# Patient Record
Sex: OTHER/UNKNOWN | Race: WHITE | NOT HISPANIC OR LATINO | Employment: PART TIME | URBAN - METROPOLITAN AREA
[De-identification: names, ages, dates, MRNs, and addresses within clinical notes are randomized per-mention and may not be internally consistent; named-entity substitution may affect disease eponyms.]

---

## 2024-08-02 ENCOUNTER — HOSPITAL ENCOUNTER (EMERGENCY)
Facility: HOSPITAL | Age: 39
Discharge: HOME | End: 2024-08-03
Payer: COMMERCIAL

## 2024-08-02 ENCOUNTER — APPOINTMENT (OUTPATIENT)
Dept: RADIOLOGY | Facility: HOSPITAL | Age: 39
End: 2024-08-02
Payer: COMMERCIAL

## 2024-08-02 VITALS
BODY MASS INDEX: 28.32 KG/M2 | DIASTOLIC BLOOD PRESSURE: 90 MMHG | OXYGEN SATURATION: 100 % | HEIGHT: 61 IN | SYSTOLIC BLOOD PRESSURE: 143 MMHG | WEIGHT: 150 LBS | TEMPERATURE: 97.8 F | HEART RATE: 67 BPM | RESPIRATION RATE: 16 BRPM

## 2024-08-02 DIAGNOSIS — N13.2 HYDRONEPHROSIS WITH URINARY OBSTRUCTION DUE TO URETERAL CALCULUS: Primary | ICD-10-CM

## 2024-08-02 LAB
ALBUMIN SERPL-MCNC: 4.8 G/DL (ref 3.5–5)
ALP BLD-CCNC: 87 U/L (ref 35–125)
ALT SERPL-CCNC: 20 U/L (ref 5–40)
ANION GAP SERPL CALC-SCNC: 13 MMOL/L
APPEARANCE UR: CLEAR
AST SERPL-CCNC: 16 U/L (ref 5–40)
BASOPHILS # BLD AUTO: 0.08 X10*3/UL (ref 0–0.1)
BASOPHILS NFR BLD AUTO: 0.5 %
BILIRUB SERPL-MCNC: 0.3 MG/DL (ref 0.1–1.2)
BILIRUB UR STRIP.AUTO-MCNC: NEGATIVE MG/DL
BUN SERPL-MCNC: 13 MG/DL (ref 8–25)
CALCIUM SERPL-MCNC: 9.9 MG/DL (ref 8.5–10.4)
CHLORIDE SERPL-SCNC: 101 MMOL/L (ref 97–107)
CO2 SERPL-SCNC: 23 MMOL/L (ref 24–31)
COLOR UR: COLORLESS
CREAT SERPL-MCNC: 1 MG/DL (ref 0.4–1.6)
EGFRCR SERPLBLD CKD-EPI 2021: 75 ML/MIN/1.73M*2
EOSINOPHIL # BLD AUTO: 0.19 X10*3/UL (ref 0–0.7)
EOSINOPHIL NFR BLD AUTO: 1.2 %
ERYTHROCYTE [DISTWIDTH] IN BLOOD BY AUTOMATED COUNT: 12.2 % (ref 11.5–14.5)
GLUCOSE SERPL-MCNC: 107 MG/DL (ref 65–99)
GLUCOSE UR STRIP.AUTO-MCNC: NORMAL MG/DL
HCG UR QL IA.RAPID: NEGATIVE
HCT VFR BLD AUTO: 42.8 % (ref 36–52)
HGB BLD-MCNC: 14.5 G/DL (ref 12–17.5)
IMM GRANULOCYTES # BLD AUTO: 0.06 X10*3/UL (ref 0–0.7)
IMM GRANULOCYTES NFR BLD AUTO: 0.4 % (ref 0–0.9)
KETONES UR STRIP.AUTO-MCNC: NEGATIVE MG/DL
LEUKOCYTE ESTERASE UR QL STRIP.AUTO: NEGATIVE
LIPASE SERPL-CCNC: 47 U/L (ref 16–63)
LYMPHOCYTES # BLD AUTO: 2.01 X10*3/UL (ref 1.2–4.8)
LYMPHOCYTES NFR BLD AUTO: 12.7 %
MCH RBC QN AUTO: 29.7 PG (ref 26–34)
MCHC RBC AUTO-ENTMCNC: 33.9 G/DL (ref 32–36)
MCV RBC AUTO: 88 FL (ref 80–100)
MONOCYTES # BLD AUTO: 0.93 X10*3/UL (ref 0.1–1)
MONOCYTES NFR BLD AUTO: 5.9 %
MUCOUS THREADS #/AREA URNS AUTO: ABNORMAL /LPF
NEUTROPHILS # BLD AUTO: 12.51 X10*3/UL (ref 1.2–7.7)
NEUTROPHILS NFR BLD AUTO: 79.3 %
NITRITE UR QL STRIP.AUTO: NEGATIVE
NRBC BLD-RTO: 0 /100 WBCS (ref 0–0)
PH UR STRIP.AUTO: 7.5 [PH]
PLATELET # BLD AUTO: 379 X10*3/UL (ref 150–450)
POTASSIUM SERPL-SCNC: 3.7 MMOL/L (ref 3.4–5.1)
PROT SERPL-MCNC: 8.1 G/DL (ref 5.9–7.9)
PROT UR STRIP.AUTO-MCNC: NEGATIVE MG/DL
RBC # BLD AUTO: 4.89 X10*6/UL (ref 4–5.9)
RBC # UR STRIP.AUTO: ABNORMAL /UL
RBC #/AREA URNS AUTO: >20 /HPF
SODIUM SERPL-SCNC: 137 MMOL/L (ref 133–145)
SP GR UR STRIP.AUTO: 1.01
UROBILINOGEN UR STRIP.AUTO-MCNC: NORMAL MG/DL
WBC # BLD AUTO: 15.8 X10*3/UL (ref 4.4–11.3)
WBC #/AREA URNS AUTO: ABNORMAL /HPF

## 2024-08-02 PROCEDURE — 83690 ASSAY OF LIPASE: CPT

## 2024-08-02 PROCEDURE — 96374 THER/PROPH/DIAG INJ IV PUSH: CPT

## 2024-08-02 PROCEDURE — 2500000004 HC RX 250 GENERAL PHARMACY W/ HCPCS (ALT 636 FOR OP/ED)

## 2024-08-02 PROCEDURE — 96375 TX/PRO/DX INJ NEW DRUG ADDON: CPT

## 2024-08-02 PROCEDURE — 74176 CT ABD & PELVIS W/O CONTRAST: CPT

## 2024-08-02 PROCEDURE — 85025 COMPLETE CBC W/AUTO DIFF WBC: CPT

## 2024-08-02 PROCEDURE — 74176 CT ABD & PELVIS W/O CONTRAST: CPT | Performed by: RADIOLOGY

## 2024-08-02 PROCEDURE — 96361 HYDRATE IV INFUSION ADD-ON: CPT

## 2024-08-02 PROCEDURE — 99284 EMERGENCY DEPT VISIT MOD MDM: CPT

## 2024-08-02 PROCEDURE — 81003 URINALYSIS AUTO W/O SCOPE: CPT

## 2024-08-02 PROCEDURE — 36415 COLL VENOUS BLD VENIPUNCTURE: CPT

## 2024-08-02 PROCEDURE — 81025 URINE PREGNANCY TEST: CPT

## 2024-08-02 PROCEDURE — 80053 COMPREHEN METABOLIC PANEL: CPT

## 2024-08-02 RX ORDER — KETOROLAC TROMETHAMINE 30 MG/ML
15 INJECTION, SOLUTION INTRAMUSCULAR; INTRAVENOUS ONCE
Status: COMPLETED | OUTPATIENT
Start: 2024-08-02 | End: 2024-08-02

## 2024-08-02 RX ORDER — ONDANSETRON HYDROCHLORIDE 2 MG/ML
4 INJECTION, SOLUTION INTRAVENOUS ONCE
Status: COMPLETED | OUTPATIENT
Start: 2024-08-02 | End: 2024-08-02

## 2024-08-02 ASSESSMENT — LIFESTYLE VARIABLES
EVER HAD A DRINK FIRST THING IN THE MORNING TO STEADY YOUR NERVES TO GET RID OF A HANGOVER: NO
EVER FELT BAD OR GUILTY ABOUT YOUR DRINKING: NO
HAVE YOU EVER FELT YOU SHOULD CUT DOWN ON YOUR DRINKING: NO
TOTAL SCORE: 0
HAVE PEOPLE ANNOYED YOU BY CRITICIZING YOUR DRINKING: NO

## 2024-08-02 ASSESSMENT — PAIN DESCRIPTION - FREQUENCY: FREQUENCY: CONSTANT/CONTINUOUS

## 2024-08-02 ASSESSMENT — PAIN SCALES - GENERAL
PAINLEVEL_OUTOF10: 7
PAINLEVEL_OUTOF10: 5 - MODERATE PAIN

## 2024-08-02 ASSESSMENT — PAIN DESCRIPTION - DESCRIPTORS: DESCRIPTORS: SHARP

## 2024-08-02 ASSESSMENT — PAIN DESCRIPTION - PAIN TYPE: TYPE: ACUTE PAIN

## 2024-08-02 ASSESSMENT — PAIN DESCRIPTION - ORIENTATION: ORIENTATION: LEFT

## 2024-08-02 ASSESSMENT — COLUMBIA-SUICIDE SEVERITY RATING SCALE - C-SSRS
6. HAVE YOU EVER DONE ANYTHING, STARTED TO DO ANYTHING, OR PREPARED TO DO ANYTHING TO END YOUR LIFE?: NO
1. IN THE PAST MONTH, HAVE YOU WISHED YOU WERE DEAD OR WISHED YOU COULD GO TO SLEEP AND NOT WAKE UP?: NO
2. HAVE YOU ACTUALLY HAD ANY THOUGHTS OF KILLING YOURSELF?: NO

## 2024-08-02 ASSESSMENT — PAIN - FUNCTIONAL ASSESSMENT: PAIN_FUNCTIONAL_ASSESSMENT: 0-10

## 2024-08-02 ASSESSMENT — PAIN DESCRIPTION - ONSET: ONSET: SUDDEN

## 2024-08-02 ASSESSMENT — PAIN DESCRIPTION - PROGRESSION: CLINICAL_PROGRESSION: NOT CHANGED

## 2024-08-02 NOTE — Clinical Note
Krysta Quinonez was seen and treated in our emergency department on 8/2/2024.  Krysta may return to work on 08/05/2024.       If you have any questions or concerns, please don't hesitate to call.      Selene German PA-C

## 2024-08-03 LAB — HOLD SPECIMEN: NORMAL

## 2024-08-03 PROCEDURE — 96375 TX/PRO/DX INJ NEW DRUG ADDON: CPT

## 2024-08-03 PROCEDURE — 2500000004 HC RX 250 GENERAL PHARMACY W/ HCPCS (ALT 636 FOR OP/ED)

## 2024-08-03 PROCEDURE — 2500000002 HC RX 250 W HCPCS SELF ADMINISTERED DRUGS (ALT 637 FOR MEDICARE OP, ALT 636 FOR OP/ED)

## 2024-08-03 RX ORDER — CEPHALEXIN 500 MG/1
500 CAPSULE ORAL 2 TIMES DAILY
Qty: 14 CAPSULE | Refills: 0 | Status: SHIPPED | OUTPATIENT
Start: 2024-08-03 | End: 2024-08-10

## 2024-08-03 RX ORDER — TAMSULOSIN HYDROCHLORIDE 0.4 MG/1
0.4 CAPSULE ORAL DAILY
Qty: 5 CAPSULE | Refills: 0 | Status: SHIPPED | OUTPATIENT
Start: 2024-08-03

## 2024-08-03 RX ORDER — TAMSULOSIN HYDROCHLORIDE 0.4 MG/1
0.4 CAPSULE ORAL DAILY
Status: DISCONTINUED | OUTPATIENT
Start: 2024-08-03 | End: 2024-08-03 | Stop reason: HOSPADM

## 2024-08-03 RX ORDER — OXYCODONE AND ACETAMINOPHEN 5; 325 MG/1; MG/1
1 TABLET ORAL EVERY 6 HOURS PRN
Qty: 6 TABLET | Refills: 0 | Status: SHIPPED | OUTPATIENT
Start: 2024-08-03 | End: 2024-08-06

## 2024-08-03 RX ORDER — ONDANSETRON 4 MG/1
4 TABLET, FILM COATED ORAL EVERY 6 HOURS
Qty: 12 TABLET | Refills: 0 | Status: SHIPPED | OUTPATIENT
Start: 2024-08-03 | End: 2024-08-06

## 2024-08-03 RX ORDER — MORPHINE SULFATE 2 MG/ML
2 INJECTION, SOLUTION INTRAMUSCULAR; INTRAVENOUS ONCE
Status: COMPLETED | OUTPATIENT
Start: 2024-08-03 | End: 2024-08-03

## 2024-08-03 NOTE — DISCHARGE INSTRUCTIONS
Please take Keflex twice daily for 7 days for coverage of infection.  Please take Flomax once daily for 5 days for dilation of the ureter to help pass your kidney stone.  Please use Zofran as needed for nausea and vomiting.  Please use Percocet as needed for severe pain and rotate with Tylenol and ibuprofen for mild to moderate pain.  It is crucial for you to follow-up with urology for further treatment of your kidney stones.  Please present back to ER if you develop any fevers or burning with urination or urinary frequency.  Refer to the attached instructions regarding hydronephrosis.    It is important to remember that your care does not end here and you must continue to monitor your condition closely. Please return to the emergency department for any worsening or concerning signs or symptoms as directed by our conversations and the discharge instructions. If you do not have a doctor please contact the referral number on your discharge instructions. Please contact any physician specialists provided in your discharge notes as it is very important to follow up with them regarding your condition. If you are unable to reach the physicians provided, please come back to the Emergency Department at any time.

## 2024-08-03 NOTE — ED TRIAGE NOTES
Pt presents to the ED for L flank pain that has been coming and going throughout the day. Pt denies any urinary complaints. Pt denies any N/V/D. Pt has a hx of kidney stones.

## 2024-08-03 NOTE — ED PROVIDER NOTES
HPI   Chief Complaint   Patient presents with    Flank Pain       HPI  Patient is a 38-year-old female presenting for evaluation of left flank pain that started today.  Patient states the pain is waxing and waning.  States this does feel like her previous kidney stones.  States she also feels some pain in her left lower quadrant which she does not believe is associated with the left flank pain.  Denies chest pain or shortness of breath.  Denies fever or chills.  Denies change in bowel habitus or urinary symptoms.  She does endorse nausea but no vomiting.  She has no other acute complaints.      Patient History   No past medical history on file.  Past Surgical History:   Procedure Laterality Date    IR  NEPHROSTOGRAM  7/28/2022    IR  NEPHROSTOGRAM [facility]    IR  NEPHROSTOGRAM  2/27/2023    IR  NEPHROSTOGRAM [facility]    US GUIDED PERCUTANEOUS PLACEMENT  7/19/2022    US GUIDED PERCUTANEOUS PLACEMENT [facility]    US GUIDED PERCUTANEOUS PLACEMENT  1/30/2023    US GUIDED PERCUTANEOUS PLACEMENT [facility]     No family history on file.  Social History     Tobacco Use    Smoking status: Not on file    Smokeless tobacco: Not on file   Substance Use Topics    Alcohol use: Not on file    Drug use: Not on file       Physical Exam   ED Triage Vitals [08/02/24 2119]   Temperature Heart Rate Respirations BP   36.6 °C (97.8 °F) 67 16 143/90      Pulse Ox Temp Source Heart Rate Source Patient Position   100 % Oral Monitor Sitting      BP Location FiO2 (%)     Left arm --       Physical Exam  Vitals and nursing note reviewed.   Constitutional:       General: Krysta is not in acute distress.     Appearance: Krysta is well-developed.      Comments: Mildly uncomfortable otherwise well-appearing female in exam chair in no acute distress   HENT:      Head: Normocephalic and atraumatic.      Mouth/Throat:      Mouth: Mucous membranes are moist.      Pharynx: Oropharynx is clear.    Eyes:      Conjunctiva/sclera: Conjunctivae normal.   Cardiovascular:      Rate and Rhythm: Normal rate and regular rhythm.      Heart sounds: No murmur heard.  Pulmonary:      Effort: Pulmonary effort is normal. No respiratory distress.      Breath sounds: Normal breath sounds.   Abdominal:      Comments: Nondistended with tenderness in the left flank and left lower quadrant   Musculoskeletal:         General: No swelling.      Cervical back: Neck supple.   Skin:     General: Skin is warm and dry.      Capillary Refill: Capillary refill takes less than 2 seconds.   Neurological:      Mental Status: Krysta is alert.   Psychiatric:         Mood and Affect: Mood normal.           ED Course & MDM   ED Course as of 08/03/24 1033   Sat Aug 03, 2024   0103 Personally reassessed the patient's vital signs prior to discharge with temperature of 98.4 heart rate 72, pulse ox 99% on room air [JJ]   0104 I did speak with the urologist Dr. Garcia.  We discussed the patient case.  He states he believes this patient is appropriate for close follow-up with him in the office on Monday or Tuesday.  He would like Keflex, Zofran, Flomax and pain medications on board.  I did discuss this plan of care with the patient and she is agreeable to this at this time. [JJ]      ED Course User Index  [JJ] Selene German PA-C         Diagnoses as of 08/03/24 1033   Hydronephrosis with urinary obstruction due to ureteral calculus                       [place] Coma Scale Score: 15                        Medical Decision Making  Parts of this chart have been completed using voice recognition software. Please excuse any errors of transcription.  My thought process and reason for plan has been formulated from the time that I saw the patient until the time of disposition and is not specific to one specific moment during their visit and furthermore my MDM encompasses this entire chart and not only this text box.      HPI: Detailed above.    Exam: A  medically appropriate exam performed, outlined above, given the known history and presentation.    History obtained from: patient    EKG: Not warranted    Social Determinants of Health considered during this visit: Lives independently    Medications given during visit:  Medications   sodium chloride 0.9 % bolus 1,000 mL (0 mL intravenous Stopped 8/2/24 2339)   ondansetron (Zofran) injection 4 mg (4 mg intravenous Given 8/2/24 2239)   ketorolac (Toradol) injection 15 mg (15 mg intravenous Given 8/2/24 2239)   morphine injection 2 mg (2 mg intravenous Given 8/3/24 0120)        Diagnostic/tests  Labs Reviewed   CBC WITH AUTO DIFFERENTIAL - Abnormal       Result Value    WBC 15.8 (*)     nRBC 0.0      RBC 4.89      Hemoglobin 14.5      Hematocrit 42.8      MCV 88      MCH 29.7      MCHC 33.9      RDW 12.2      Platelets 379      Neutrophils % 79.3      Immature Granulocytes %, Automated 0.4      Lymphocytes % 12.7      Monocytes % 5.9      Eosinophils % 1.2      Basophils % 0.5      Neutrophils Absolute 12.51 (*)     Immature Granulocytes Absolute, Automated 0.06      Lymphocytes Absolute 2.01      Monocytes Absolute 0.93      Eosinophils Absolute 0.19      Basophils Absolute 0.08     COMPREHENSIVE METABOLIC PANEL - Abnormal    Glucose 107 (*)     Sodium 137      Potassium 3.7      Chloride 101      Bicarbonate 23 (*)     Urea Nitrogen 13      Creatinine 1.00      eGFR 75      Calcium 9.9      Albumin 4.8      Alkaline Phosphatase 87      Total Protein 8.1 (*)     AST 16      Bilirubin, Total 0.3      ALT 20      Anion Gap 13     URINALYSIS WITH REFLEX CULTURE AND MICROSCOPIC - Abnormal    Color, Urine Colorless (*)     Appearance, Urine Clear      Specific Gravity, Urine 1.009      pH, Urine 7.5      Protein, Urine NEGATIVE      Glucose, Urine Normal      Blood, Urine OVER (3+) (*)     Ketones, Urine NEGATIVE      Bilirubin, Urine NEGATIVE      Urobilinogen, Urine Normal      Nitrite, Urine NEGATIVE      Leukocyte  Esterase, Urine NEGATIVE     URINALYSIS MICROSCOPIC WITH REFLEX CULTURE - Abnormal    WBC, Urine 1-5      RBC, Urine >20 (*)     Mucus, Urine FEW     LIPASE - Normal    Lipase 47     HCG, URINE, QUALITATIVE - Normal    HCG, Urine NEGATIVE     URINALYSIS WITH REFLEX CULTURE AND MICROSCOPIC    Narrative:     The following orders were created for panel order Urinalysis with Reflex Culture and Microscopic.  Procedure                               Abnormality         Status                     ---------                               -----------         ------                     Urinalysis with Reflex C...[[id number]]  Abnormal            Final result               Extra Urine Gray Tube[[id number]]                            Final result                 Please view results for these tests on the individual orders.   EXTRA URINE GRAY TUBE    Extra Tube Hold for add-ons.        CT abdomen pelvis wo IV contrast   Final Result   Left hydronephrosis with 8 mm and 4 mm calculi in the proximal left   ureter. Nonobstructive left renal calculi measure up to 6 mm.   Asymmetric left perinephric edema may relate to obstructive process,   however correlation with urinalysis recommended to exclude   superimposed infection.        MACRO:   None        Signed by: Juan Pablo Pelaez 8/3/2024 12:34 AM   Dictation workstation:   [id number]           Considerations/further MDM:  Patient is a 38-year-old female presenting for evaluation of left flank pain, left lower quadrant abdominal pain    Differential diagnosis associated with the patient presentation includes: Ureterolithiasis versus diverticulitis versus pyelonephritis versus urinary tract infection versus bowel obstruction versus bowel perforation versus pancreatitis versus appendicitis    Patient slightly uncomfortable but otherwise well-appearing and hemodynamically stable upon arrival to ER.  Vital signs with a normal heart rate, normal blood pressure patient remains afebrile during the  visit.    IV fluids, IV Zofran and IV Toradol provided for symptom relief.    Laboratory workup with leukocytosis but otherwise unremarkable without electrolyte abnormality or MISTY.  Lipase within normal limits.    CT abdomen pelvis with evidence of 8 mm and 4 mm obstructing stones in the left proximal ureter.  Otherwise without acute abnormality identified.    Urinalysis is without evidence of urinary tract infection.    I did speak with the urologist regarding the patient case who recommends close follow-up with him outpatient in the next 48-72 hours as discussed in the ED course.  I do have low suspicion for infected stone given reassessment of vital signs with normal heart rate, normal blood pressure and patient remaining afebrile.  She remains well-appearing stating improvement of her symptoms with treatment in the ER.  Patient is agreeable with discharge home with p.o. prescriptions for Keflex, Zofran, Percocet and Flomax.  Strict return precautions were discussed including development of fever, chills or urinary symptoms.    I discussed the laboratory and imaging findings with the patient at bedside. Patient's questions and concerns were addressed. Patient was released in good condition, discharged with instructions to follow up with primary care provider and appropriate specialist, and to return to ED at any time for worsening symptoms or any other concerns. Patient demonstrates understanding of the findings and the importance of appropriate follow up care.     Procedure  Procedures     Selene German PA-C  08/03/24 1034

## 2024-09-12 ENCOUNTER — HOSPITAL ENCOUNTER (OUTPATIENT)
Dept: RADIOLOGY | Facility: HOSPITAL | Age: 39
Discharge: HOME | End: 2024-09-12
Payer: COMMERCIAL

## 2024-09-12 ENCOUNTER — APPOINTMENT (OUTPATIENT)
Dept: UROLOGY | Facility: CLINIC | Age: 39
End: 2024-09-12
Payer: COMMERCIAL

## 2024-09-12 VITALS — TEMPERATURE: 97.4 F | HEIGHT: 61 IN | BODY MASS INDEX: 28.32 KG/M2 | WEIGHT: 150 LBS

## 2024-09-12 DIAGNOSIS — R82.90 ABNORMAL FINDING ON URINALYSIS: ICD-10-CM

## 2024-09-12 DIAGNOSIS — N20.0 KIDNEY STONES: ICD-10-CM

## 2024-09-12 DIAGNOSIS — N13.2 HYDRONEPHROSIS WITH URINARY OBSTRUCTION DUE TO URETERAL CALCULUS: Primary | ICD-10-CM

## 2024-09-12 PROBLEM — R82.81 PYURIA: Status: ACTIVE | Noted: 2024-09-12

## 2024-09-12 LAB
POC APPEARANCE, URINE: CLEAR
POC BILIRUBIN, URINE: NEGATIVE
POC BLOOD, URINE: NEGATIVE
POC COLOR, URINE: YELLOW
POC GLUCOSE, URINE: NEGATIVE MG/DL
POC KETONES, URINE: NEGATIVE MG/DL
POC LEUKOCYTES, URINE: ABNORMAL
POC NITRITE,URINE: NEGATIVE
POC PH, URINE: 7 PH
POC PROTEIN, URINE: NEGATIVE MG/DL
POC SPECIFIC GRAVITY, URINE: 1.01
POC UROBILINOGEN, URINE: 0.2 EU/DL

## 2024-09-12 PROCEDURE — 3008F BODY MASS INDEX DOCD: CPT | Performed by: UROLOGY

## 2024-09-12 PROCEDURE — 1036F TOBACCO NON-USER: CPT | Performed by: UROLOGY

## 2024-09-12 PROCEDURE — 99204 OFFICE O/P NEW MOD 45 MIN: CPT | Performed by: UROLOGY

## 2024-09-12 PROCEDURE — 81002 URINALYSIS NONAUTO W/O SCOPE: CPT | Performed by: UROLOGY

## 2024-09-12 PROCEDURE — 74018 RADEX ABDOMEN 1 VIEW: CPT

## 2024-09-12 ASSESSMENT — PAIN SCALES - GENERAL: PAINLEVEL: 0-NO PAIN

## 2024-09-12 NOTE — PROGRESS NOTES
"  Patient is a 38 y.o. adult presenting with left ureteral stones    SUBJECTIVE:  HPI   She presented to the emergency department and was found to have an 8 mm ureteral stone in the proximal left ureter.  She also had some lower pole stones measuring 6 and 5 mm on the left.  Her right kidney appears negative for stones.  She has had stones in the past and required percutaneous nephrolithotomy.  She had a struvite stone.  She feels well today.      No results found for: \"URINECULTURE\"     No past medical history on file.   Past Surgical History:   Procedure Laterality Date    IR  NEPHROSTOGRAM  7/28/2022    IR  NEPHROSTOGRAM [facility]    IR  NEPHROSTOGRAM  2/27/2023    IR  NEPHROSTOGRAM [facility]    US GUIDED PERCUTANEOUS PLACEMENT  7/19/2022    US GUIDED PERCUTANEOUS PLACEMENT [facility]    US GUIDED PERCUTANEOUS PLACEMENT  1/30/2023    US GUIDED PERCUTANEOUS PLACEMENT [facility]      No family history on file.   Social History     Socioeconomic History    Marital status:    Tobacco Use    Smoking status: Never    Smokeless tobacco: Never        Review of Systems   Constitutional: denies any unintentional weight loss or change in strength.  Integumentary: denies any rashes or pruritus.  Eyes: denies any double vision or eye pain.  Ear/Nose/Mouth/Throat: denies any nosebleeds or gum bleeds.  Cardiovascular: denies any chest pain or syncope.  Respiratory: denies hemoptysis.  Gastrointestinal: denies nausea or vomiting.  Musculoskeletal: denies muscle cramping or weakness.  Neurologic: denies convulsions or seizures.  Hematologic/Lymphatic: denies bleeding tendencies.  Endocrine: denies heat/cold intolerance.  All other systems have been reviewed and are negative unless otherwise noted in the HPI.    OBJECTIVE:  Visit Vitals  Temp 36.3 °C (97.4 °F)     Physical Exam   Constitutional: No obvious distress.  Eyes: Non-injected conjunctiva, sclera clear, " "EOMI.  Ears/Nose/Mouth/Throat: No obvious drainage per ears or nose.  Cardiovascular: Extremities are warm and well perfused. No edema, cyanosis or pallor.  Respiratory: No audible wheezing/stridor; respirations do not appear labored.  Gastrointestinal: Abdomen soft, not distended.  Musculoskeletal: Normal ROM of extremities.  Skin: No obvious rashes or open sores.  Neurologic: Alert and oriented, CN 2-12 grossly intact.  Psychiatric: Answers questions appropriately with normal affect.  Hematologic/Lymphatic/Immunologic: No obvious bruises or sites of spontaneous bleeding.  Genitourinary: No CVA tenderness, bladder not palpable.     Labs:  Lab Results   Component Value Date    WBC 15.8 (H) 08/02/2024    HGB 14.5 08/02/2024    HCT 42.8 08/02/2024     08/02/2024    ALT 20 08/02/2024    AST 16 08/02/2024     08/02/2024    K 3.7 08/02/2024     08/02/2024    CREATININE 1.00 08/02/2024    BUN 13 08/02/2024    CO2 23 (L) 08/02/2024     No results found for: \"KPSAT\", \"KPSAP\"  IMAGING:  Her CT scan was viewed with her.    PROCEDURES:    ASSESSMENT/PLAN:  Problem List Items Addressed This Visit       Kidney stones    Relevant Orders    XR abdomen 1 view     Other Visit Diagnoses       Hydronephrosis with urinary obstruction due to ureteral calculus    -  Primary    Relevant Orders    POCT UA (nonautomated) manually resulted (Completed)    Abnormal finding on urinalysis        Relevant Orders    Urine Culture    Microscopic Only, Urine        She has an 8 deformity of stone in the proximal left ureter, and left renal stones measuring 5 and 6 mm.  We reviewed treatment options and she will have a KUB performed today.  She would likely be scheduled for ESWL thereafter.    We discussed kidney stone prevention and she will undergo a 24-hour urinalysis and further blood work after stone is treated.    Her urine today had trace leukocytes and was sent for microscopy, culture, and sensitivity.      All questions " were answered to the patient’s satisfaction.  Patient agrees with the plan and wishes to proceed.  Follow-up will be scheduled appropriately.     Karel Garcia MD

## 2024-09-12 NOTE — PATIENT INSTRUCTIONS
Your provider ordered an xray, called a FAITH, you may walk into any  Radiology Facility to have this performed or you may schedule at [phone]    Some one from our scheduling office will call to discuss surgery dates after X-ray results are received

## 2024-09-13 LAB
BACTERIA #/AREA URNS AUTO: ABNORMAL /HPF
MUCOUS THREADS #/AREA URNS AUTO: ABNORMAL /LPF
RBC #/AREA URNS AUTO: ABNORMAL /HPF
SQUAMOUS #/AREA URNS AUTO: ABNORMAL /HPF
WBC #/AREA URNS AUTO: ABNORMAL /HPF

## 2024-09-14 DIAGNOSIS — N20.1 LEFT URETERAL STONE: Primary | ICD-10-CM

## 2024-09-14 LAB — BACTERIA UR CULT: NORMAL
